# Patient Record
Sex: FEMALE | Race: WHITE | NOT HISPANIC OR LATINO | RURAL
[De-identification: names, ages, dates, MRNs, and addresses within clinical notes are randomized per-mention and may not be internally consistent; named-entity substitution may affect disease eponyms.]

---

## 2024-07-15 ENCOUNTER — CLINICAL SUPPORT (OUTPATIENT)
Dept: PRIMARY CARE CLINIC | Facility: CLINIC | Age: 49
End: 2024-07-15

## 2024-07-15 DIAGNOSIS — Z13.39 ALCOHOL SCREENING: ICD-10-CM

## 2024-07-15 DIAGNOSIS — Z02.89 ENCOUNTER FOR PHYSICAL EXAMINATION RELATED TO EMPLOYMENT: Primary | ICD-10-CM

## 2024-07-15 DIAGNOSIS — Z01.10 ENCOUNTER FOR HEARING EXAMINATION, UNSPECIFIED WHETHER ABNORMAL FINDINGS: ICD-10-CM

## 2024-07-15 DIAGNOSIS — Z02.83 ENCOUNTER FOR DRUG SCREENING: ICD-10-CM

## 2024-07-15 NOTE — PROGRESS NOTES
Subjective     Patient ID: Judith Brewer is a 49 y.o. female.    Chief Complaint: No chief complaint on file.    HPI  Review of Systems       Objective     Physical Exam       Assessment and Plan     1. Encounter for physical examination related to employment    2. Encounter for hearing examination, unspecified whether abnormal findings    3. Encounter for drug screening    4. Alcohol screening        See scanned documents in .            No follow-ups on file.

## 2024-10-01 ENCOUNTER — HOSPITAL ENCOUNTER (EMERGENCY)
Facility: HOSPITAL | Age: 49
Discharge: HOME | End: 2024-10-01
Attending: EMERGENCY MEDICINE
Payer: COMMERCIAL

## 2024-10-01 VITALS
HEART RATE: 94 BPM | RESPIRATION RATE: 16 BRPM | HEIGHT: 65 IN | BODY MASS INDEX: 30.81 KG/M2 | WEIGHT: 184.94 LBS | OXYGEN SATURATION: 100 % | DIASTOLIC BLOOD PRESSURE: 88 MMHG | SYSTOLIC BLOOD PRESSURE: 145 MMHG | TEMPERATURE: 98 F

## 2024-10-01 DIAGNOSIS — G44.1 OTHER VASCULAR HEADACHE: ICD-10-CM

## 2024-10-01 DIAGNOSIS — I10 UNCONTROLLED HYPERTENSION: Primary | ICD-10-CM

## 2024-10-01 LAB — GLUCOSE SERPL-MCNC: 160 MG/DL (ref 70–105)

## 2024-10-01 PROCEDURE — 25000003 PHARM REV CODE 250: Performed by: EMERGENCY MEDICINE

## 2024-10-01 PROCEDURE — 99283 EMERGENCY DEPT VISIT LOW MDM: CPT

## 2024-10-01 PROCEDURE — 99284 EMERGENCY DEPT VISIT MOD MDM: CPT | Mod: ,,, | Performed by: EMERGENCY MEDICINE

## 2024-10-01 PROCEDURE — 82962 GLUCOSE BLOOD TEST: CPT

## 2024-10-01 RX ORDER — FERROUS SULFATE TAB 325 MG (65 MG ELEMENTAL FE) 325 (65 FE) MG
TAB ORAL
COMMUNITY
Start: 2024-08-28

## 2024-10-01 RX ORDER — AMLODIPINE BESYLATE 10 MG/1
10 TABLET ORAL
COMMUNITY
Start: 2024-08-28

## 2024-10-01 RX ORDER — ACETAMINOPHEN 325 MG/1
650 TABLET ORAL
Status: COMPLETED | OUTPATIENT
Start: 2024-10-01 | End: 2024-10-01

## 2024-10-01 RX ORDER — ATORVASTATIN CALCIUM 20 MG/1
20 TABLET, FILM COATED ORAL NIGHTLY
COMMUNITY
Start: 2024-09-04 | End: 2024-10-01 | Stop reason: CLARIF

## 2024-10-01 RX ORDER — CLONIDINE HYDROCHLORIDE 0.1 MG/1
0.1 TABLET ORAL
Status: COMPLETED | OUTPATIENT
Start: 2024-10-01 | End: 2024-10-01

## 2024-10-01 RX ADMIN — ACETAMINOPHEN 650 MG: 325 TABLET ORAL at 04:10

## 2024-10-01 RX ADMIN — CLONIDINE HYDROCHLORIDE 0.1 MG: 0.1 TABLET ORAL at 04:10

## 2024-10-01 NOTE — ED NOTES
"PT STATES "HEADACHE IS GONE, I AM FEELING BETTER"    PT REPORTS SHE STOPPED TAKING HER LIPITOR BECAUSE IT WAS MAKING HER "JITTERY"/ADVISED PT TO NOTIFY PCP OF SAME/VERBALIZED UNDERSTANDING  "

## 2024-10-01 NOTE — ED TRIAGE NOTES
"PT C/O ELEVATED BP/PT REPORTS SHE WOKE UP IN A "SWEAT", HEADACHE, AND WAS WORRIED HER BP WAS ELEVATED    PT TOOK AN EXTRA DOSE OF AMLODIPINE 10MG 09/30/2024 AT 2300  "

## 2024-10-01 NOTE — ED PROVIDER NOTES
Encounter Date: 10/1/2024       History     Chief Complaint   Patient presents with    Hypertension     Patient presents with report that she woke up in a sweat with a headache and was worried about her blood pressure being high.  Recently diagnosed with type 2 diabetes but has not yet been treated for same.  States she is supposed to follow up with her primary physician to discuss an elevated hemoglobin A1c which she reports was 6.7.  She takes amlodipine for hypertension.  No neurologic deficits.  No chest pain or shortness of breath.      Review of patient's allergies indicates:  No Known Allergies  Past Medical History:   Diagnosis Date    Anemia, unspecified     Diabetes mellitus     Hypertension      Past Surgical History:   Procedure Laterality Date    ABDOMINOPLASTY       SECTION      X3     No family history on file.  Social History     Tobacco Use    Smoking status: Never    Smokeless tobacco: Never   Substance Use Topics    Alcohol use: Not Currently    Drug use: Never     Review of Systems   Constitutional:  Positive for diaphoresis. Negative for activity change, appetite change, chills, fatigue and fever.   HENT: Negative.     Eyes: Negative.    Respiratory: Negative.  Negative for chest tightness and shortness of breath.    Cardiovascular: Negative.  Negative for chest pain, palpitations and leg swelling.   Gastrointestinal: Negative.    Genitourinary: Negative.    Musculoskeletal: Negative.    Skin: Negative.    Neurological: Negative.    Psychiatric/Behavioral: Negative.     All other systems reviewed and are negative.      Physical Exam     Initial Vitals [10/01/24 0403]   BP Pulse Resp Temp SpO2   (!) 162/102 104 18 98.3 °F (36.8 °C) 99 %      MAP       --         Physical Exam    Nursing note and vitals reviewed.  Constitutional: She appears well-developed and well-nourished. She is not diaphoretic. No distress.   HENT:   Right Ear: External ear normal.   Left Ear: External ear normal.    Nose: Nose normal. Mouth/Throat: Oropharynx is clear and moist.   Eyes: Conjunctivae and EOM are normal. Pupils are equal, round, and reactive to light.   Neck: Neck supple. No JVD present.   Normal range of motion.  Cardiovascular:  Normal rate, regular rhythm, normal heart sounds and intact distal pulses.           No murmur heard.  Pulmonary/Chest: Breath sounds normal. No stridor. No respiratory distress. She has no wheezes. She has no rhonchi. She has no rales.   Abdominal: Abdomen is soft. Bowel sounds are normal. She exhibits no distension. There is no abdominal tenderness.   Musculoskeletal:         General: No tenderness or edema. Normal range of motion.      Cervical back: Normal range of motion and neck supple.     Lymphadenopathy:     She has no cervical adenopathy.   Neurological: She is alert and oriented to person, place, and time. She has normal strength. No cranial nerve deficit. GCS score is 15. GCS eye subscore is 4. GCS verbal subscore is 5. GCS motor subscore is 6.   Skin: Skin is warm and dry. Capillary refill takes less than 2 seconds. No rash noted. No erythema. No pallor.   Psychiatric: She has a normal mood and affect. Her behavior is normal.         Medical Screening Exam   See Full Note    ED Course   Procedures  Labs Reviewed   POCT GLUCOSE MONITORING CONTINUOUS - Abnormal       Result Value    POC Glucose 160 (*)           Imaging Results    None          Medications   cloNIDine tablet 0.1 mg (0.1 mg Oral Given 10/1/24 0420)   acetaminophen tablet 650 mg (650 mg Oral Given 10/1/24 0420)     Medical Decision Making  Differential diagnosis includes uncontrolled hypertension, headache.  Fingerstick blood sugar was 100 60 mg/dL.  Patient was treated with clonidine 0.1 mg p.o. and Tylenol.  Patient was observed and headache resolved blood pressure came down to 145/88.  Discharge and follow up instructions given.    Risk  OTC drugs.  Prescription drug management.                                       Clinical Impression:   Final diagnoses:  [I10] Uncontrolled hypertension (Primary)  [G44.1] Other vascular headache        ED Disposition Condition    Discharge Stable          ED Prescriptions    None       Follow-up Information       Follow up With Specialties Details Why Contact Info    Moncho Coronado,  Family Medicine Schedule an appointment as soon as possible for a visit in 1 day To recheck blood pressure, discuss a different blood pressure medication such as an angiotensin receptor blocker to coincide with your new recently given diagnosis of type 2 diabetes, and for ongoing management of hypertension and diabetes. [id number] [address] [address] [phone]               Harley Feng DO  10/01/24 0616